# Patient Record
Sex: MALE | Race: WHITE | NOT HISPANIC OR LATINO | Employment: FULL TIME | RURAL
[De-identification: names, ages, dates, MRNs, and addresses within clinical notes are randomized per-mention and may not be internally consistent; named-entity substitution may affect disease eponyms.]

---

## 2023-03-06 DIAGNOSIS — M54.12 CERVICAL RADICULOPATHY: Primary | ICD-10-CM

## 2023-03-06 DIAGNOSIS — M54.16 LUMBAR RADICULOPATHY: ICD-10-CM

## 2023-03-10 ENCOUNTER — HOSPITAL ENCOUNTER (OUTPATIENT)
Dept: RADIOLOGY | Facility: HOSPITAL | Age: 53
Discharge: HOME | End: 2023-03-10
Attending: ORTHOPAEDIC SURGERY
Payer: OTHER GOVERNMENT

## 2023-03-10 ENCOUNTER — OFFICE VISIT (OUTPATIENT)
Dept: SPINE | Facility: CLINIC | Age: 53
End: 2023-03-10
Payer: OTHER GOVERNMENT

## 2023-03-10 VITALS
TEMPERATURE: 98 F | BODY MASS INDEX: 37.94 KG/M2 | WEIGHT: 265 LBS | DIASTOLIC BLOOD PRESSURE: 80 MMHG | OXYGEN SATURATION: 96 % | HEIGHT: 70 IN | HEART RATE: 86 BPM | SYSTOLIC BLOOD PRESSURE: 118 MMHG

## 2023-03-10 DIAGNOSIS — M54.16 LUMBAR RADICULOPATHY: ICD-10-CM

## 2023-03-10 DIAGNOSIS — M54.16 LUMBAR RADICULOPATHY: Primary | ICD-10-CM

## 2023-03-10 DIAGNOSIS — M54.12 CERVICAL RADICULOPATHY: ICD-10-CM

## 2023-03-10 PROCEDURE — 72050 X-RAY EXAM NECK SPINE 4/5VWS: CPT | Mod: TC

## 2023-03-10 PROCEDURE — 72050 XR CERVICAL SPINE AP LAT WITH FLEX EXTEN: ICD-10-PCS | Mod: 26,,, | Performed by: ORTHOPAEDIC SURGERY

## 2023-03-10 PROCEDURE — 99214 OFFICE O/P EST MOD 30 MIN: CPT | Mod: PBBFAC | Performed by: ORTHOPAEDIC SURGERY

## 2023-03-10 PROCEDURE — 99204 PR OFFICE/OUTPT VISIT, NEW, LEVL IV, 45-59 MIN: ICD-10-PCS | Mod: S$PBB,,, | Performed by: ORTHOPAEDIC SURGERY

## 2023-03-10 PROCEDURE — 72110 X-RAY EXAM L-2 SPINE 4/>VWS: CPT | Mod: TC

## 2023-03-10 PROCEDURE — 72110 XR LUMBAR SPINE 4-5 VIEW WITH BENDING VIEWS: ICD-10-PCS | Mod: 26,,, | Performed by: ORTHOPAEDIC SURGERY

## 2023-03-10 PROCEDURE — 72050 X-RAY EXAM NECK SPINE 4/5VWS: CPT | Mod: 26,,, | Performed by: ORTHOPAEDIC SURGERY

## 2023-03-10 PROCEDURE — 72110 X-RAY EXAM L-2 SPINE 4/>VWS: CPT | Mod: 26,,, | Performed by: ORTHOPAEDIC SURGERY

## 2023-03-10 PROCEDURE — 99204 OFFICE O/P NEW MOD 45 MIN: CPT | Mod: S$PBB,,, | Performed by: ORTHOPAEDIC SURGERY

## 2023-03-10 RX ORDER — GABAPENTIN 300 MG/1
300 CAPSULE ORAL 3 TIMES DAILY
COMMUNITY

## 2023-03-10 RX ORDER — IBUPROFEN 800 MG/1
800 TABLET ORAL 3 TIMES DAILY
COMMUNITY

## 2023-03-10 RX ORDER — ATORVASTATIN CALCIUM 10 MG/1
10 TABLET, FILM COATED ORAL DAILY
COMMUNITY

## 2023-03-10 NOTE — PROGRESS NOTES
AP, lateral, flexion/extension views of the lumbar spine reviewed    On the AP there is normal coronal alignment.  There are 5 non-rib-bearing lumbar vertebrae.  On the lateral there is decreased lumbar lordosis.  There is spondylotic disease with decreased disc height and osteophyte formation noted.  No fractures or listhesis noted.  No instability on flexion-extension views.    Impression:  Spondylotic changes of the lumbar spine as noted above

## 2023-03-10 NOTE — PROGRESS NOTES
AP, lateral, flexion/extension views of the cervical spine reviewed    On the AP there is normal coronal alignment.  There is uncovertebral and facet hypertrophy seen.  On the lateral there is loss of cervical lordosis.  There are spondylotic changes noted with decrease in disc height and osteophyte formation seen.  No fractures or listhesis noted.  No instability on flexion-extension views.    Impression:  Degenerative changes of cervical spine as noted above

## 2023-03-10 NOTE — PROGRESS NOTES
MDM/time:  Greater than 45 minutes spent on this encounter including 15 minutes reviewing imaging and notes, 20 minutes with the patient, 10 minutes documentation    ASSESSMENT:  52 y.o. male with cervical spondylosis with myeloradiculopathy, lumbar spondylosis with radiculopathy     PLAN:  Discussed MRI cervical spine - myelopathy discussed surgical options vs monitoring symptoms   Lumbar spine - referral to pain management to discuss lumbar spine injections   And PT lumbar spine   Follow up 3 months     HPI:  52 y.o. male here for evaluation of neck pain that has been off/on for 20+ years.  Patient reports pain over the past year has increased with increased neck pain and weakness in his arms and bilateral hands right greater than left.  He also reports a 20+ year history of lower back pain as well.  Patient reports decreased  strength in bilateral hands.  Issues with balance but no recent falls.  Denies bladder bowel incontinence.  Decreased walking tolerance due to back pain positive shopping cart sign.  Patient currently is taking gabapentin 300 mg tablet 2 tablets 2 to 3 times a day.  No recent physical therapy.  No prior pain management.  Recent MRI 12/07/2023 of his cervical and lumbar spine.  No recent prior spine surgery.  Patient currently vapes nicotine    IMAGING:  AP, lateral, flexion/extension views of the cervical spine     On the AP there is normal coronal alignment.  There is uncovertebral and facet hypertrophy seen. On the lateral there is loss of cervical lordosis.  There are spondylotic changes noted with decrease in disc height and osteophyte formation seen.  No fractures or listhesis noted.  No instability on flexion-extension views.     Impression:  Degenerative changes of cervical spine as noted above            AP, lateral, flexion/extension views of the lumbar spine reviewed     On the AP there is normal coronal alignment.  There are 5 non-rib-bearing lumbar vertebrae.  On the  lateral there is decreased lumbar lordosis.  There is spondylotic disease with decreased disc height and osteophyte formation noted.  No fractures or listhesis noted.  No instability on flexion-extension views.     Impression:  Spondylotic changes of the lumbar spine as noted above        12/07/2022 MRI cervical spine reviewed showed:  C3-4 moderate central stenosis, severe bilateral foraminal stenosis   C4-5 mild central stenosis, severe bilateral foraminal stenosis   C5-6 severe central/moderate foraminal stenosis with myelomalacia   C6-7 severe central stenosis with bilateral foraminal stenosis    12/07/2022 MRI lumbar spine reviewed showed:  L2-3 moderate central stenosis/bilateral lateral recess stenosis with moderate bilateral foraminal stenosis  L3-4 moderate central stenosis/severe bilateral lateral recess stenosis and bilateral foraminal stenosis  L4-5 moderate central stenosis/severe bilateral lateral recess stenosis and moderate bilateral foraminal stenosis  L5-S1 moderate bilateral foraminal stenosis       Past Medical History:   Diagnosis Date    High cholesterol      History reviewed. No pertinent surgical history.  Social History     Tobacco Use    Smoking status: Former     Packs/day: 1.00     Types: Vaping with nicotine, Cigarettes     Quit date: 2020     Years since quitting: 3.1    Smokeless tobacco: Never   Substance Use Topics    Alcohol use: Never    Drug use: Never      Current Outpatient Medications   Medication Instructions    atorvastatin (LIPITOR) 10 mg, Oral, Daily    gabapentin (NEURONTIN) 300 mg, Oral, 3 times daily    ibuprofen (ADVIL,MOTRIN) 800 mg, Oral, 3 times daily        EXAM:  Constitutional  General Appearance:  Body mass index is 38.02 kg/m²., NAD  Psychiatric   Orientation: Oriented to time, oriented to place, oriented to person  Mood and Affect: Active and alert, normal mood, normal affect  Gait and Station   Appearance:  Wide based gait, unable to tandem gait, unable to  walk on toes, unable to walk on heels    CERVICAL  Musculoskeletal System  Shoulder:  normal appearance, no instability, no tenderness, normal ROM right, normal ROM left, no pain with ROM    Cervical Spine  Inspection:  alignment normal, no muscle atrophy  Soft Tissue Palpation on the Right:  no tenderness of the paracervicals, no tenderness of the trapezius, no tenderness of the rhomboid  Soft Tissue Palpation on the Left:  no tenderness of the paracervicals, no tenderness of the trapezius, no tenderness of the rhomboid  Bony Palpation:  no tenderness of the occipital protuberance  Active Range:     Flexion decreased, Extension decreased, Rotation to the left normal, Rotation to the right normal, Lateral flexion to the left normal, Lateral flexion to the right normal   Pain elicited on motion    Motor Strength  C5 on the right:  abduction deltoid 4/5  C5 on the left:  abduction deltoid 4/5  C6 on the Right:  flexion biceps 4/5, wrist extension 4/5  C6 on the Left:  flexion biceps 4/5, wrist extension 4/5  C7 on the Right:  extension fingers 5/5, extension triceps 5/5  C7 on the Left:  extension fingers 5/5, extension triceps 5/5  C8 on the Right:  flexion fingers 5/5  C8 on the Left:  flexion fingers 5/5  T1 on the Right:  abduction fingers 5/5  T1 on the Left:  abduction fingers 5/5    Neurological System  Sensation on the Right:  normal sensation of the extremities: right, normal median nerve distribution, normal ulnar nerve distribution  Sensation on the Left:  normal sensation of the extremities: left, normal median nerve distribution, normal ulnar nerve distribution  Biceps Reflex Right:  normal, Brachioradialis Reflex Right:  normal, Triceps Reflex Right: normal  Biceps Reflex Left:  normal, Brachioradialis Reflex Left: normal, Triceps Reflex Left:  normal  Special Test on the Right:  Spurlings test negative, Hoffmans reflex absent, no ankle clonus, Durkan test negative, Tinels sign negative at the  elbow  Special Test on the Left:  Spurlings test negative, Hoffmans reflex absent, no ankle clonus, Durkan test negative, Tinels sign negative at the elbow    Skin:   Head and Neck:  normal   Right Upper Extremity:  normal   Left Upper Extremity:  normal    Cardiovascular:   Arterial Pulses Right:  radial right   Arterial Pulses Left:  radial left   Edema Right:  none   Edema Left:  none    LUMBAR  Musculoskeletal System   Hips: Normal appearance, no leg length discrepancy, normal motion; left, normal motion; right    Lumbar Spine                   Inspection:  Normal alignment, normal sagittal balance                  Range of motion:  Decreased flexion, extension, lateral bending, rotation. Pain with range of motion                  Bony Palpation of the Lumbar Spine:  No tenderness of the spinous process, no tenderness of the sacrum, no tenderness of the coccyx                  Bony Palpation of the Right Hip:  No tenderness of the iliac crest, no tenderness of the sciatic notch, no tenderness of the SI joint                  Bony Palpation of the Left Hip:  No tenderness of the iliac crest, no tenderness of the sciatic notch, no tenderness of the SI joint                  Soft Tissue Palpation on the Right:  No tenderness of the paraspinal region, no tenderness of the iliolumbar region                  Soft Tissue Palpation on the Left:  No tenderness of the paraspinal region, no tenderness of the iliolumbar region    Motor Strength   L1 Right:  Hip flexion iliopsoas 5/5    L1 Left:  Hip flexion iliopsoas 5/5              L2-L4 Right:  Knee extension quadriceps 5/5, tibialis anterior 5/5              L2-L4 Left:  Knee extension quadriceps 5/5, tibialis anterior 5/5   L5 Right:  Extensor hallucis llongus 5/5,    L5 Left:  Extensor hallucis longus 5/5,    S1 Right:  Plantar flexion gastrocnemius 5/5   S1 Left:  Plantar flexion gastrocnemius 5/5    Neurological System   Ankle Reflex Right:  normal   Ankle Reflex  Left: normal   Knee Reflex Right:  normal   Knee Reflex Left:  normal   Sensation on the Right:  L2 normal, L3 normal, L4 normal, L5 normal, S1 normal   Sensation on the Left:  L2 normal, L3 normal, L4 normal, L5 normal, S1 normal              Special Test on the Right:  Seated straight leg raising test negative, no clonus of the ankle              Special Test on the Left:  Seated straight leg raising test negative, no clonus of the ankle    Skin   Lumbosacral Spine:  Normal skin    Cardiovascular System   Arterial Pulses Right:  Posterior tibialis normal, dorsalis pedis normal   Arterial Pulses Left:  Posterior tibialis normal, dorsalis pedis normal   Edema Right: None   Edema Left:  None

## 2023-04-12 ENCOUNTER — CLINICAL SUPPORT (OUTPATIENT)
Dept: PRIMARY CARE CLINIC | Facility: CLINIC | Age: 53
End: 2023-04-12

## 2023-04-12 DIAGNOSIS — Z02.4 ENCOUNTER FOR DEPARTMENT OF TRANSPORTATION (DOT) EXAMINATION FOR DRIVING LICENSE RENEWAL: Primary | ICD-10-CM

## 2023-04-12 PROCEDURE — 99499 PR PHYSICAL - DOT/CDL: ICD-10-PCS | Mod: ,,, | Performed by: NURSE PRACTITIONER

## 2023-04-12 PROCEDURE — 99499 UNLISTED E&M SERVICE: CPT | Mod: ,,, | Performed by: NURSE PRACTITIONER

## 2023-04-12 NOTE — PROGRESS NOTES
Subjective     Patient ID: Tramaine Bauer is a 52 y.o. male.    Chief Complaint: No chief complaint on file.    HPI  Review of Systems       Objective     Physical Exam       Assessment and Plan     Problem List Items Addressed This Visit    None  Visit Diagnoses       Encounter for Department of Transportation (DOT) examination for driving license renewal    -  Primary            See scanned documents in .

## 2023-05-04 ENCOUNTER — TELEPHONE (OUTPATIENT)
Dept: GASTROENTEROLOGY | Facility: CLINIC | Age: 53
End: 2023-05-04
Payer: OTHER GOVERNMENT

## 2023-05-04 DIAGNOSIS — Z12.11 ENCOUNTER FOR SCREENING COLONOSCOPY: Primary | ICD-10-CM

## 2023-05-17 ENCOUNTER — CLINICAL SUPPORT (OUTPATIENT)
Dept: REHABILITATION | Facility: HOSPITAL | Age: 53
End: 2023-05-17
Payer: OTHER GOVERNMENT

## 2023-05-17 DIAGNOSIS — M25.60 DECREASED RANGE OF MOTION: ICD-10-CM

## 2023-05-17 DIAGNOSIS — R53.1 DECREASED STRENGTH: ICD-10-CM

## 2023-05-17 DIAGNOSIS — M54.16 LUMBAR RADICULOPATHY: Primary | ICD-10-CM

## 2023-05-17 DIAGNOSIS — R52 PAIN: ICD-10-CM

## 2023-05-17 PROCEDURE — 97161 PT EVAL LOW COMPLEX 20 MIN: CPT

## 2023-05-17 NOTE — PLAN OF CARE
[facility]  Physical Therapy Initial Evaluation    Date: 5/17/2023   Name: Tramaine Bauer  Clinic Number: [id number]    Therapy Diagnosis:   Encounter Diagnoses   Name Primary?    Lumbar radiculopathy Yes    Pain     Decreased strength     Decreased range of motion      Physician: Phillip Arteaga MD    Physician Orders: PT Eval and Treat   Medical Diagnosis from Referral: Lumbar Radiculopathy  Evaluation Date: 5/17/2023  Authorization Period Expiration: 3/9/24  Plan of Care Expiration: 8/16/23  Visit # / Visits authorized: 1/ 15    Time In: 210  Time Out: 308  Total Appointment Time (timed & untimed codes): 58 minutes    Precautions: Standard and Fall    Subjective   Date of onset: Chronic condition    History of current condition - TRAMAINE reports: Gradual worsening throughout the past 20 years. Relief with swimming. Prolonged positions make the pain worse in the low back and neck. The neck feels someone is stabbing the lower neck and radiates across the shoulders and is intermittent. The both arms/hand feels like it wants to go to sleep and drop items but right is worse than left. Left handed.   The low back pain is a constant dull ache with occasional tingling and hot pins and needles in the buttocks and down the legs. The left leg is worse than the right. Poor balance but may be from the previous bilateral foot injuries (30 years ago - possible arch collapse) and patient states he has always walked funny and occasionally had a foot slap. Through time the knee pain began. Occasional falls from the left leg buckling or a 'misstep'. Relief with medication, resting and shifting positions. Wake at night from pain and interchange from bed and recliner but recliner hurts the low back more so trying to avoid the recliner.    See MD 6/16  Job Duties:  at [employer] - squatting/lifting/crouching      Medical History:   Past Medical History:   Diagnosis Date    High cholesterol         Surgical History:   Tramaine Bauer  has no past surgical history on file.    Medications:   Tramaine has a current medication list which includes the following prescription(s): atorvastatin, gabapentin, and ibuprofen.    Allergies:   Review of patient's allergies indicates:  Not on File     Imaging, MRI studies, bone scan films: Impression:  Degenerative changes of cervical spine as noted above     AP, lateral, flexion/extension views of the lumbar spine reviewed     On the AP there is normal coronal alignment.  There are 5 non-rib-bearing lumbar vertebrae.  On the lateral there is decreased lumbar lordosis.  There is spondylotic disease with decreased disc height and osteophyte formation noted.  No fractures or listhesis noted.  No instability on flexion-extension views.     Impression:  Spondylotic changes of the lumbar spine as noted above     12/07/2022 MRI cervical spine reviewed showed:  C3-4 moderate central stenosis, severe bilateral foraminal stenosis   C4-5 mild central stenosis, severe bilateral foraminal stenosis   C5-6 severe central/moderate foraminal stenosis with myelomalacia   C6-7 severe central stenosis with bilateral foraminal stenosis     12/07/2022 MRI lumbar spine reviewed showed:  L2-3 moderate central stenosis/bilateral lateral recess stenosis with moderate bilateral foraminal stenosis  L3-4 moderate central stenosis/severe bilateral lateral recess stenosis and bilateral foraminal stenosis  L4-5 moderate central stenosis/severe bilateral lateral recess stenosis and moderate bilateral foraminal stenosis  L5-S1 moderate bilateral foraminal stenosis    Pain:  Current 7/10, worst 10/10,  Location: bilateral back  and neck    Description: Aching, Dull, Throbbing, Tingling, Numb, Electric, and Hot  Aggravating Factors: Sitting, Standing, Bending, Walking, Night Time, Extension, and Lifting  Easing Factors: pain medication and rest        Objective     Unable to single leg balance for  more than 2 seconds and has loss of balance  (+) palpation left PSIS   Pain in the center of the low back during active forward bend   Normal bilateral shoulder range of motion but pain with internal rotation behind back   Hypomobile with left innonimate rotation   Decreased light touch over left L2, L4 and L5 dermatome  Decreased light touch over left C7 and T1  Manual muscle test bilateral hip flexion 4-/5  Manual muscle test bilateral hamstring 3+/5  Manual muscle test bilateral quad 3/5  Manual muscle test right DF 3-/5 - compensates with inversion of ankle and left DF 3+/5  Manual muscle test right glute med 3/5, left glute 3+/5  (+) bilateral sitting slump with burning in the center of lower spine  No leg length  (+) right SHON with pain in the low back     NO FOTO COMPLETED      Home Exercises and Patient Education Provided    Education provided:   Boni Findings  - Patient educated on biomechanical justification for therapeutic exercise and importance of compliance with HEP in order to improve overall impairments and QOL   Patient was educated on all the above exercise prior/during/after for proper posture, positioning, and execution for safe performance with home exercise program.   Patient educated on the importance of improved core and upper and lower extremity strength in order to improve alignment of the spine and upper and lower extremities with static positions and dynamic movement.   Patient educated on the importance of strong core and lower extremity musculature in order to improve both static and dynamic balance, improve gait mechanics, reduce fall risk and improve household and community mobility.     Written Home Exercises Provided:  - .  Exercises were reviewed and TRAMAINE was able to demonstrate them prior to the end of the session.  TRAMAINE demonstrated  -  understanding of the education provided.     See EMR under  -  for exercises provided  - .    Assessment   Tramaine is a 52 y.o. male  referred to outpatient Physical Therapy with a medical diagnosis of low back and neck pain. Patient presents with instability and poor balance. Patient has an old injury of bilateral feet and states he walks 'funny'. Patient has generalized weakness and poor core/hip stabilization and significantly tight muscles of the lower extremities. Patient has had chronic pain for years in the feet, knees and low back. Patient also has neck pain and demonstrates bilateral upper trapezius/paraspinal tightness and guarding. Patient had imaging of cervical and lumbar spine and has areas of severe degeneration which is most likely causing the pain and guarding. Patient will benefit from skilled physical therapy to address deficits.     Patient prognosis is Guarded.     Patient will benefit from skilled outpatient Physical Therapy to address the deficits stated above and in the chart below, provide patient /family education, and to maximize patientt's level of independence.     Plan of care discussed with patient: Yes  Patient's spiritual, cultural and educational needs considered and patient is agreeable to the plan of care and goals as stated below:     Anticipated Barriers for therapy: chronic condition with previous injuries    Goals:  Short Term Goals: 6 weeks   Patient will be able to sleep ~ 3 hours without waking from pain  Patient will improve manual muscle test to 3+/5 for bilateral lower extremities   Patient will maintain prolonged positions x 15 minutes with 6 /10 pain  Patient will report change in pain from constant to intermittent in the low back    Long Term Goals: 12 weeks   Patient will be able to sleep through the night without waking from pain  Patient will improve manual muscle test to 4-/5 for bilateral lower extremities   Patient will maintain prolonged positions x 30 minutes with  4 /10 pain  Patient will be able to balance on each leg x 5 seconds with no loss of balance    Plan   Plan of care  Certification: 5/17/2023 to 8/16/23.    Outpatient Physical Therapy 2 times weekly for 12 weeks to include the following interventions: Aquatic Therapy, Hybresis with Dex, Cervical/Lumbar Traction, Electrical Stimulation IFC/Premod, Gait Training, Manual Therapy, Moist Heat/ Ice, Neuromuscular Re-ed, Patient Education, Self Care, Therapeutic Activities, Therapeutic Exercise, Ultrasound, and Dry Needling.     LAURA DIOP, PT        I CERTIFY THE NEED FOR THESE SERVICES FURNISHED UNDER THIS PLAN OF TREATMENT AND WHILE UNDER MY CARE.    Physician's comments:      Physician's Signature: ____________________________________________Date________________        Please fax this MD signed form to:  [facility]  [phone] fax

## 2023-06-14 ENCOUNTER — CLINICAL SUPPORT (OUTPATIENT)
Dept: REHABILITATION | Facility: HOSPITAL | Age: 53
End: 2023-06-14
Payer: OTHER GOVERNMENT

## 2023-06-14 DIAGNOSIS — M25.60 DECREASED RANGE OF MOTION: ICD-10-CM

## 2023-06-14 DIAGNOSIS — R52 PAIN: Primary | ICD-10-CM

## 2023-06-14 DIAGNOSIS — R53.1 DECREASED STRENGTH: ICD-10-CM

## 2023-06-14 PROCEDURE — 97112 NEUROMUSCULAR REEDUCATION: CPT | Mod: CQ

## 2023-06-14 PROCEDURE — 97012 MECHANICAL TRACTION THERAPY: CPT | Mod: CQ

## 2023-06-14 PROCEDURE — 97110 THERAPEUTIC EXERCISES: CPT | Mod: CQ

## 2023-06-14 NOTE — PROGRESS NOTES
[facility]   Physical Therapy Treatment Note / Discharge Summary    Name: Tramaine Bauer  Clinic Number: [id number]    Therapy Diagnosis:   Encounter Diagnoses   Name Primary?    Pain Yes    Decreased strength     Decreased range of motion      Physician: Phillip Arteaga MD    Visit Date: 6/14/2023    Physician: Phillip Arteaga MD     Physician Orders: PT Eval and Treat   Medical Diagnosis from Referral: Lumbar Radiculopathy  Evaluation Date: 5/17/2023  Authorization Period Expiration: 3/9/24  Plan of Care Expiration: 8/16/23  Visit # / Visits authorized: 2/ 15       PTA Visit #: 1/5     Time In: 11:10 am  Time Out: 12:05 pm  Total Billable Time: 50 minutes    SUBJECTIVE     Pt reports: His pain is a 5/10 today and that he has some tingling on his right side..  He  N/A  compliant with home exercise program.  Response to previous treatment: first treatment after evaluation   Functional change: none     Pain: 5/10  Location: bilateral back      OBJECTIVE     Objective Measures updated at progress report unless specified.     Treatment     TRAMAINE received the treatments listed below:      therapeutic exercises to develop strength, posture, and core stabilization for 16 minutes including:  NuStep 5 minutes   Slant board 3 x 30 seconds  Hamstring stretch 3 x 30 seconds    manual therapy techniques: Joint mobilizations, Manual traction, Soft tissue Mobilization, and Friction Massage were applied to the: - for - minutes, including:    neuromuscular re-education activities to improve: Balance, Coordination, Proprioception, and Posture for 19 minutes. The following activities were included:  Pelvic tilt 2 x 10   Marches 2 x 10   Lower trunk rotation x 10   Hip abduction ---    supervised modalities after being cleared for contradictions: TENS:  Tramaine received TENS electrical stimulation for pain to the lower back. Pt received continuous mode - for  -minutes. Tramaine tolerated  treatment well without any adverse effects.     Mechanical Traction:  Tramaine received intermittent mechanical traction to the lumbar spine at a force of 80 pounds for a total of 15 minutes. Hold time of 60 seconds and rest time for 20 seconds. Patient tolerated treatment well without any adverse effects.        Patient Education and Home Exercises     Home Exercises Provided and Patient Education Provided     Education provided:   - home exercise program will be given next visit    Written Home Exercises Provided:  N/a . Exercises were reviewed and TRAMAINE was able to demonstrate them prior to the end of the session.  TRAMAINE demonstrated  N/A  understanding of the education provided. See EMR under Patient Instructions for exercises provided during therapy sessions    ASSESSMENT     Case conference with Amelia Samano DPT. Patient was informed on correct body mechanics to perform exercises correctly. Patient was very guarded throughout treatment session and did have minimal complaints of pain throughout treatment session . Patient had difficulty with exercises and had complaints of muscles spasms on left side due to compensation from pain in right.    TRAMAINE Is progressing towards his goals.   Pt prognosis is Good.     Pt will continue to benefit from skilled outpatient physical therapy to address the deficits listed in the problem list box on initial evaluation, provide pt/family education and to maximize pt's level of independence in the home and community environment.     Pt's spiritual, cultural and educational needs considered and pt agreeable to plan of care and goals.     Anticipated barriers to physical therapy: chronic pain    Goals:   Short Term Goals: 6 weeks   Patient will be able to sleep ~ 3 hours without waking from pain  Patient will improve manual muscle test to 3+/5 for bilateral lower extremities   Patient will maintain prolonged positions x 15 minutes with 6 /10 pain  Patient will report  change in pain from constant to intermittent in the low back     Long Term Goals: 12 weeks   Patient will be able to sleep through the night without waking from pain  Patient will improve manual muscle test to 4-/5 for bilateral lower extremities   Patient will maintain prolonged positions x 30 minutes with  4 /10 pain  Patient will be able to balance on each leg x 5 seconds with no loss of balance    PLAN     Plan of care Certification: 5/17/2023 to 8/16/23.     Outpatient Physical Therapy 2 times weekly for 12 weeks to include the following interventions: Aquatic Therapy, Hybresis with Dex, Cervical/Lumbar Traction, Electrical Stimulation IFC/Premod, Gait Training, Manual Therapy, Moist Heat/ Ice, Neuromuscular Re-ed, Patient Education, Self Care, Therapeutic Activities, Therapeutic Exercise, Ultrasound, and Dry Needling.     Lainey Martel, STEVO   06/14/2023      Patient did not return after session. Reason Unknown. Please consider this note a discharge from physical therapy. Thank you for this referral!  Amelia Samano DPT, MTC

## 2023-08-16 ENCOUNTER — OFFICE VISIT (OUTPATIENT)
Dept: FAMILY MEDICINE | Facility: CLINIC | Age: 53
End: 2023-08-16
Payer: OTHER GOVERNMENT

## 2023-08-16 VITALS
HEART RATE: 78 BPM | OXYGEN SATURATION: 97 % | TEMPERATURE: 98 F | WEIGHT: 265 LBS | SYSTOLIC BLOOD PRESSURE: 165 MMHG | HEIGHT: 70 IN | BODY MASS INDEX: 37.94 KG/M2 | DIASTOLIC BLOOD PRESSURE: 91 MMHG

## 2023-08-16 DIAGNOSIS — J20.8 ACUTE BRONCHITIS DUE TO OTHER SPECIFIED ORGANISMS: ICD-10-CM

## 2023-08-16 DIAGNOSIS — R05.1 ACUTE COUGH: ICD-10-CM

## 2023-08-16 DIAGNOSIS — Z20.822 LAB TEST NEGATIVE FOR COVID-19 VIRUS: ICD-10-CM

## 2023-08-16 DIAGNOSIS — J06.9 UPPER RESPIRATORY INFECTION, ACUTE: Primary | ICD-10-CM

## 2023-08-16 PROBLEM — M54.2 NECK PAIN: Status: ACTIVE | Noted: 2023-08-16

## 2023-08-16 PROBLEM — K21.9 GASTROESOPHAGEAL REFLUX DISEASE: Status: ACTIVE | Noted: 2023-08-16

## 2023-08-16 PROBLEM — M54.12 CERVICAL RADICULOPATHY: Status: ACTIVE | Noted: 2023-08-16

## 2023-08-16 PROBLEM — F17.200 SMOKER: Status: ACTIVE | Noted: 2023-08-16

## 2023-08-16 PROBLEM — M54.50 CHRONIC LOW BACK PAIN: Status: ACTIVE | Noted: 2023-08-16

## 2023-08-16 PROBLEM — G89.29 CHRONIC LOW BACK PAIN: Status: ACTIVE | Noted: 2023-08-16

## 2023-08-16 PROBLEM — M54.16 LUMBAR RADICULOPATHY: Status: ACTIVE | Noted: 2023-08-16

## 2023-08-16 PROBLEM — E66.01 MORBID OBESITY: Status: ACTIVE | Noted: 2023-08-16

## 2023-08-16 PROBLEM — Z72.0 TOBACCO USER: Status: ACTIVE | Noted: 2023-08-16

## 2023-08-16 PROBLEM — M21.40 PES PLANUS: Status: ACTIVE | Noted: 2023-08-16

## 2023-08-16 PROBLEM — E88.810 METABOLIC SYNDROME: Status: ACTIVE | Noted: 2023-08-16

## 2023-08-16 PROBLEM — M22.40 CHONDROMALACIA PATELLAE: Status: ACTIVE | Noted: 2018-06-18

## 2023-08-16 PROBLEM — E78.5 HYPERLIPIDEMIA: Status: ACTIVE | Noted: 2023-08-16

## 2023-08-16 LAB
CTP QC/QA: YES
FLUAV AG NPH QL: NEGATIVE
FLUBV AG NPH QL: NEGATIVE
SARS-COV-2 AG RESP QL IA.RAPID: NEGATIVE

## 2023-08-16 PROCEDURE — 99214 OFFICE O/P EST MOD 30 MIN: CPT | Mod: ,,, | Performed by: NURSE PRACTITIONER

## 2023-08-16 PROCEDURE — 99214 PR OFFICE/OUTPT VISIT, EST, LEVL IV, 30-39 MIN: ICD-10-PCS | Mod: ,,, | Performed by: NURSE PRACTITIONER

## 2023-08-16 PROCEDURE — 87428 POCT SARS-COV2 (COVID) WITH FLU ANTIGEN: ICD-10-PCS | Mod: QW,,, | Performed by: NURSE PRACTITIONER

## 2023-08-16 PROCEDURE — 87428 SARSCOV & INF VIR A&B AG IA: CPT | Mod: QW,,, | Performed by: NURSE PRACTITIONER

## 2023-08-16 RX ORDER — DOXYCYCLINE HYCLATE 100 MG
100 TABLET ORAL EVERY 12 HOURS
Qty: 20 TABLET | Refills: 0 | Status: SHIPPED | OUTPATIENT
Start: 2023-08-16

## 2023-08-16 RX ORDER — BENZONATATE 100 MG/1
100 CAPSULE ORAL 3 TIMES DAILY PRN
Qty: 30 CAPSULE | Refills: 0 | Status: SHIPPED | OUTPATIENT
Start: 2023-08-16 | End: 2023-08-26

## 2023-08-16 RX ORDER — ALBUTEROL SULFATE 90 UG/1
2 AEROSOL, METERED RESPIRATORY (INHALATION) EVERY 6 HOURS PRN
Qty: 18 G | Refills: 0 | Status: SHIPPED | OUTPATIENT
Start: 2023-08-16 | End: 2024-08-15

## 2023-08-16 RX ORDER — METHYLPREDNISOLONE 4 MG/1
TABLET ORAL
Qty: 21 TABLET | Refills: 0 | Status: SHIPPED | OUTPATIENT
Start: 2023-08-16

## 2023-08-16 RX ORDER — GUAIFENESIN 1200 MG/1
1 TABLET, EXTENDED RELEASE ORAL EVERY 12 HOURS
Qty: 20 TABLET | Refills: 0 | Status: SHIPPED | OUTPATIENT
Start: 2023-08-16 | End: 2023-08-26

## 2023-08-16 NOTE — PROGRESS NOTES
"Subjective:       Patient ID: Tramaine Bauer is a 52 y.o. male.    Vitals:  height is 5' 10" (1.778 m) and weight is 120.2 kg (265 lb). His temperature is 97.7 °F (36.5 °C). His blood pressure is 165/91 (abnormal) and his pulse is 78. His oxygen saturation is 97%.     Chief Complaint: Cough, Headache, and Nasal Congestion (Been going on for two weeks )    SR is a 53 y/o WM who presents today with c/o URI symptoms x10-14 days.     URI   This is a new problem. The current episode started 1 to 4 weeks ago. The problem has been gradually worsening. There has been no fever. Associated symptoms include congestion, coughing, headaches, a plugged ear sensation, rhinorrhea, sinus pain and sneezing. Pertinent negatives include no ear pain, nausea, sore throat, vomiting or wheezing. He has tried increased fluids for the symptoms. The treatment provided mild relief.       Constitution: Negative for chills, fatigue and fever.   HENT:  Positive for congestion, postnasal drip and sinus pain. Negative for ear pain and sore throat.    Respiratory:  Positive for cough and sputum production (small amount). Negative for chest tightness and wheezing.    Gastrointestinal:  Negative for nausea and vomiting.   Allergic/Immunologic: Positive for sneezing.   Neurological:  Positive for headaches.         Objective:      Physical Exam  Vitals reviewed.   Constitutional:       Appearance: Normal appearance. He is obese. He is ill-appearing.   HENT:      Nose: Congestion present.      Right Turbinates: Swollen.      Left Turbinates: Swollen.      Mouth/Throat:      Pharynx: Posterior oropharyngeal erythema present.      Comments: Postnasal drip  Cardiovascular:      Rate and Rhythm: Normal rate and regular rhythm.   Pulmonary:      Effort: Pulmonary effort is normal.      Breath sounds: Examination of the right-middle field reveals wheezing. Wheezing and rhonchi present.   Neurological:      Mental Status: He is alert and oriented to " person, place, and time.   Psychiatric:         Mood and Affect: Mood normal.         Behavior: Behavior normal.         Thought Content: Thought content normal.         Judgment: Judgment normal.        Assessment:       1. Upper respiratory infection, acute    2. Acute cough    3. Lab test negative for COVID-19 virus    4. Acute bronchitis due to other specified organisms          Recent Results (from the past 24 hour(s))   POCT SARS-COV2 (COVID) with Flu Antigen    Collection Time: 08/16/23 10:42 AM   Result Value Ref Range    SARS Coronavirus 2 Antigen Negative Negative    Rapid Influenza A Ag Negative Negative    Rapid Influenza B Ag Negative Negative     Acceptable Yes         Plan:         Upper respiratory infection, acute  -     doxycycline (VIBRA-TABS) 100 MG tablet; Take 1 tablet (100 mg total) by mouth every 12 (twelve) hours.  Dispense: 20 tablet; Refill: 0  -     methylPREDNISolone (MEDROL DOSEPACK) 4 mg tablet; use as directed  Dispense: 21 tablet; Refill: 0  -     guaiFENesin (MUCINEX) 1,200 mg Ta12; Take 1 tablet by mouth every 12 (twelve) hours. for 10 days  Dispense: 20 tablet; Refill: 0    Acute cough  -     POCT SARS-COV2 (COVID) with Flu Antigen  -     benzonatate (TESSALON) 100 MG capsule; Take 1 capsule (100 mg total) by mouth 3 (three) times daily as needed for Cough.  Dispense: 30 capsule; Refill: 0    Lab test negative for COVID-19 virus    Acute bronchitis due to other specified organisms  -     albuterol (VENTOLIN HFA) 90 mcg/actuation inhaler; Inhale 2 puffs into the lungs every 6 (six) hours as needed for Wheezing. Rescue  Dispense: 18 g; Refill: 0      No follow-ups on file.     Future Appointments   Date Time Provider Department Center   8/16/2023 12:00 PM Cira Duffy FNP [facility] DEQUAN Valdez After Visit Summary was printed and given to the patient.     Signature:    DARIELA León  Family Nurse Practitioner  [facility]    Date of encounter: 8/16/23

## 2023-08-16 NOTE — PATIENT INSTRUCTIONS
Please drink plenty of fluids.  Please get plenty of rest.  Please return here or go to the Emergency Department for any concerns or worsening of condition.  If you were prescribed antibiotics, please take them to completion.  If you were given wait & see antibiotics, please wait 5-7 days before taking them, and only take them if your symptoms have worsened or not improved.  If you do begin taking the antibiotics, please take them to completion.  If you were prescribed a narcotic medication, do not drive or operate heavy equipment or machinery while taking these medications.  If you were given a steroid shot in the clinic and have also been given a prescription for a steroid such as Prednisone or a Medrol Dose Pack, please begin taking them tomorrow.  If you do not have Hypertension or any history of palpitations, it is ok to take over the counter Sudafed or Mucinex D or Allegra-D or Claritin-D or Zyrtec-D.  If you do take one of the above, it is ok to combine that with plain over the counter Mucinex or Allegra or Claritin or Zyrtec.  If for example you are taking Zyrtec -D, you can combine that with Mucinex, but not Mucinex-D.  If you are taking Mucinex-D, you can combine that with plain Allegra or Claritin or Zyrtec.   If you do have Hypertension or palpitations, it is safe to take Coricidin HBP for relief of sinus symptoms.  If not allergic, please take over the counter Tylenol (Acetaminophen) and/or Motrin (Ibuprofen) as directed for control of pain and/or fever.  Please follow up with your primary care doctor or specialist as needed.    If you  smoke, please stop smoking.

## 2023-09-15 PROBLEM — R52 PAIN: Status: RESOLVED | Noted: 2023-05-17 | Resolved: 2023-09-15

## 2023-09-15 PROBLEM — R53.1 DECREASED STRENGTH: Status: RESOLVED | Noted: 2023-05-17 | Resolved: 2023-09-15

## 2023-09-15 PROBLEM — M25.60 DECREASED RANGE OF MOTION: Status: RESOLVED | Noted: 2023-05-17 | Resolved: 2023-09-15

## 2023-09-19 DIAGNOSIS — Z12.11 SCREENING FOR COLON CANCER: Primary | ICD-10-CM

## 2024-02-07 ENCOUNTER — ANESTHESIA EVENT (OUTPATIENT)
Dept: GASTROENTEROLOGY | Facility: HOSPITAL | Age: 54
End: 2024-02-07
Payer: OTHER GOVERNMENT

## 2024-02-07 ENCOUNTER — HOSPITAL ENCOUNTER (OUTPATIENT)
Dept: GASTROENTEROLOGY | Facility: HOSPITAL | Age: 54
Discharge: HOME | End: 2024-02-07
Attending: INTERNAL MEDICINE
Payer: OTHER GOVERNMENT

## 2024-02-07 ENCOUNTER — ANESTHESIA (OUTPATIENT)
Dept: GASTROENTEROLOGY | Facility: HOSPITAL | Age: 54
End: 2024-02-07
Payer: OTHER GOVERNMENT

## 2024-02-07 VITALS
OXYGEN SATURATION: 96 % | SYSTOLIC BLOOD PRESSURE: 125 MMHG | DIASTOLIC BLOOD PRESSURE: 85 MMHG | TEMPERATURE: 98 F | WEIGHT: 265 LBS | BODY MASS INDEX: 37.94 KG/M2 | RESPIRATION RATE: 17 BRPM | HEIGHT: 70 IN | HEART RATE: 81 BPM

## 2024-02-07 DIAGNOSIS — Z83.719 FAMILY HISTORY OF COLONIC POLYPS: Primary | ICD-10-CM

## 2024-02-07 DIAGNOSIS — D12.4 ADENOMATOUS POLYP OF DESCENDING COLON: ICD-10-CM

## 2024-02-07 DIAGNOSIS — D12.8 ADENOMATOUS POLYP OF RECTUM: ICD-10-CM

## 2024-02-07 DIAGNOSIS — K63.5 POLYP OF TRANSVERSE COLON, UNSPECIFIED TYPE: ICD-10-CM

## 2024-02-07 DIAGNOSIS — K63.5 POLYP OF CECUM: ICD-10-CM

## 2024-02-07 DIAGNOSIS — Z12.11 SCREENING FOR COLON CANCER: ICD-10-CM

## 2024-02-07 PROCEDURE — 37000008 HC ANESTHESIA 1ST 15 MINUTES

## 2024-02-07 PROCEDURE — 37000009 HC ANESTHESIA EA ADD 15 MINS

## 2024-02-07 PROCEDURE — 45385 COLONOSCOPY W/LESION REMOVAL: CPT | Mod: 33,,, | Performed by: INTERNAL MEDICINE

## 2024-02-07 PROCEDURE — 27000284 HC CANNULA NASAL: Performed by: NURSE ANESTHETIST, CERTIFIED REGISTERED

## 2024-02-07 PROCEDURE — 45380 COLONOSCOPY AND BIOPSY: CPT | Mod: 33,59,, | Performed by: INTERNAL MEDICINE

## 2024-02-07 PROCEDURE — 45385 COLONOSCOPY W/LESION REMOVAL: CPT | Mod: PT | Performed by: INTERNAL MEDICINE

## 2024-02-07 PROCEDURE — 45380 COLONOSCOPY AND BIOPSY: CPT | Mod: PT,59 | Performed by: INTERNAL MEDICINE

## 2024-02-07 PROCEDURE — 63600175 PHARM REV CODE 636 W HCPCS: Performed by: NURSE ANESTHETIST, CERTIFIED REGISTERED

## 2024-02-07 PROCEDURE — 88305 TISSUE EXAM BY PATHOLOGIST: CPT | Mod: TC,SUR | Performed by: INTERNAL MEDICINE

## 2024-02-07 PROCEDURE — D9220A PRA ANESTHESIA: Mod: 33,,, | Performed by: NURSE ANESTHETIST, CERTIFIED REGISTERED

## 2024-02-07 PROCEDURE — 25000003 PHARM REV CODE 250: Performed by: NURSE ANESTHETIST, CERTIFIED REGISTERED

## 2024-02-07 PROCEDURE — 27201423 OPTIME MED/SURG SUP & DEVICES STERILE SUPPLY

## 2024-02-07 PROCEDURE — 27000716 HC OXISENSOR PROBE, ANY SIZE: Performed by: NURSE ANESTHETIST, CERTIFIED REGISTERED

## 2024-02-07 PROCEDURE — 88305 TISSUE EXAM BY PATHOLOGIST: CPT | Mod: 26,,, | Performed by: PATHOLOGY

## 2024-02-07 RX ORDER — LIDOCAINE HYDROCHLORIDE 20 MG/ML
INJECTION, SOLUTION EPIDURAL; INFILTRATION; INTRACAUDAL; PERINEURAL
Status: DISCONTINUED | OUTPATIENT
Start: 2024-02-07 | End: 2024-02-07

## 2024-02-07 RX ORDER — SODIUM CHLORIDE 0.9 % (FLUSH) 0.9 %
10 SYRINGE (ML) INJECTION
Status: DISCONTINUED | OUTPATIENT
Start: 2024-02-07 | End: 2024-02-08 | Stop reason: HOSPADM

## 2024-02-07 RX ORDER — PROPOFOL 10 MG/ML
VIAL (ML) INTRAVENOUS
Status: DISCONTINUED | OUTPATIENT
Start: 2024-02-07 | End: 2024-02-07

## 2024-02-07 RX ADMIN — SODIUM CHLORIDE: 9 INJECTION, SOLUTION INTRAVENOUS at 11:02

## 2024-02-07 RX ADMIN — LIDOCAINE HYDROCHLORIDE 100 MG: 20 INJECTION, SOLUTION INTRAVENOUS at 12:02

## 2024-02-07 RX ADMIN — PROPOFOL 100 MG: 10 INJECTION, EMULSION INTRAVENOUS at 12:02

## 2024-02-07 NOTE — TRANSFER OF CARE
"Anesthesia Transfer of Care Note    Patient: Tramaine Bauer    Procedure(s) Performed: * No procedures listed *    Patient location: GI    Anesthesia Type: general    Transport from OR: Transported from OR on room air with adequate spontaneous ventilation. Continuous ECG monitoring in transport. Continuous SpO2 monitoring in transport    Post pain: adequate analgesia    Post assessment: no apparent anesthetic complications    Post vital signs: stable    Level of consciousness: sedated and responds to stimulation    Nausea/Vomiting: no nausea/vomiting    Complications: none    Transfer of care protocol was followedComments: Good SV continue, NAD, VSS, RTRN      Last vitals: Visit Vitals  BP (!) 119/50 (BP Location: Right forearm, Patient Position: Lying)   Pulse 83   Temp 36.6 °C (97.8 °F) (Oral)   Resp 20   Ht 5' 10" (1.778 m)   Wt 120.2 kg (265 lb)   SpO2 98%   BMI 38.02 kg/m²     "

## 2024-02-07 NOTE — DISCHARGE INSTRUCTIONS
Procedure Date  2/7/24     Impression  Overall Impression:   5 polyps were removed  A cecum polyp and a distal transverse colon polyp were removed via biopsy. A descending colon polyp at the splenic flexure was removed via hot snare. Two rectal polyps were removed via hot snare and one of the rectal polypectomy sites was closed with a hemostasis clip.     Recommendation    Await pathology results     Repeat colonoscopy in 3 years      Discharge: disp; DC to home. High fiber diet. No driving x 24 hours. Follow-up with PCP as scheduled.  Dx: five colon polyps were removed during this exam; family history of colon polyps.    No driving today, no operating heavy machinery, no signing any legal documents until tomorrow.    Drink lots of fluids, resume regular diet.  Take your normal medications.     Please call our office for any nausea, vomiting or abdominal pain.

## 2024-02-07 NOTE — H&P
[facility]  Gastroenterology  H&P    Patient Name: Tramaine Bauer  MRN: [id number]  Admission Date: 2024  Code Status: No Order    Attending Provider: Jr Short MD   Primary Care Physician: Beba Day NP  Principal Problem:<principal problem not specified>    Subjective:     History of Present Illness: Pt presents for screening colonoscopy and no prior colonoscopy report is available.    Past Medical History:   Diagnosis Date    High cholesterol        History reviewed. No pertinent surgical history.    Review of patient's allergies indicates:  No Known Allergies  Family History       Problem Relation (Age of Onset)    Bone cancer Father          Tobacco Use    Smoking status: Former     Current packs/day: 0.00     Types: Vaping with nicotine, Cigarettes     Quit date: 2020     Years since quittin.1    Smokeless tobacco: Never   Substance and Sexual Activity    Alcohol use: Never    Drug use: Never    Sexual activity: Not on file     Review of Systems   Respiratory: Negative.     Cardiovascular: Negative.    Gastrointestinal: Negative.      Objective:     Vital Signs (Most Recent):  Pulse: 83 (24 1136)  Resp: 19 (24 1136)  BP: 139/76 (24 1136)  SpO2: 97 % (24 1136) Vital Signs (24h Range):  Pulse:  [83] 83  Resp:  [19] 19  SpO2:  [97 %] 97 %  BP: (139)/(76) 139/76     Weight: 120.2 kg (265 lb) (24 1136)  Body mass index is 38.02 kg/m².    No intake or output data in the 24 hours ending 24 1157    Lines/Drains/Airways       Peripheral Intravenous Line  Duration                  Peripheral IV - Single Lumen 24 1135 22 G Left;Posterior Hand <1 day                    Physical Exam  Vitals reviewed.   Constitutional:       General: He is not in acute distress.     Appearance: Normal appearance. He is well-developed. He is obese. He is not ill-appearing.   HENT:      Head: Normocephalic and atraumatic.      Nose: Nose normal.   Eyes:       "Pupils: Pupils are equal, round, and reactive to light.   Cardiovascular:      Rate and Rhythm: Normal rate and regular rhythm.   Pulmonary:      Effort: Pulmonary effort is normal.      Breath sounds: Normal breath sounds. No wheezing.   Abdominal:      General: Abdomen is flat. Bowel sounds are normal. There is no distension.      Palpations: Abdomen is soft.      Tenderness: There is no abdominal tenderness. There is no guarding.   Skin:     General: Skin is warm and dry.      Coloration: Skin is not jaundiced.   Neurological:      Mental Status: He is alert.   Psychiatric:         Attention and Perception: Attention normal.         Mood and Affect: Affect normal.         Speech: Speech normal.         Behavior: Behavior is cooperative.      Comments: Pt was calm while speaking.         Significant Labs:  CBC: No results for input(s): "WBC", "HGB", "HCT", "PLT" in the last 48 hours.  CMP: No results for input(s): "GLU", "CALCIUM", "ALBUMIN", "PROT", "NA", "K", "CO2", "CL", "BUN", "CREATININE", "ALKPHOS", "ALT", "AST", "BILITOT" in the last 48 hours.    Significant Imaging:  Imaging results within the past 24 hours have been reviewed.    Assessment/Plan:     There are no hospital problems to display for this patient.        Imp: screening for colon neoplasm  Plan: colonoscopy    Jr Short MD  Gastroenterology  [facility]  "

## 2024-02-07 NOTE — ANESTHESIA POSTPROCEDURE EVALUATION
Anesthesia Post Evaluation    Patient: Tramaine Bauer    Procedure(s) Performed: Colonoscopy    Final Anesthesia Type: general      Patient location during evaluation: GI PACU  Patient participation: Yes- Able to Participate  Level of consciousness: awake and alert  Post-procedure vital signs: reviewed and stable  Pain management: adequate  Airway patency: patent    PONV status at discharge: No PONV  Anesthetic complications: no      Cardiovascular status: blood pressure returned to baseline and hemodynamically stable  Respiratory status: spontaneous ventilation  Hydration status: euvolemic  Follow-up not needed.  Comments: Refer to nursing notes for pain/maribel score upon discharge from recovery.              Vitals Value Taken Time   /85 02/07/24 1257   Temp 97.8 02/07/24 1345   Pulse 81 02/07/24 1257   Resp 17 02/07/24 1257   SpO2 96 % 02/07/24 1257         Event Time   Out of Recovery 13:05:02         Pain/Maribel Score: Maribel Score: 9 (2/7/2024 12:39 PM)

## 2024-02-07 NOTE — ANESTHESIA PREPROCEDURE EVALUATION
02/07/2024  Tramaine Bauer is a 53 y.o., male.      Pre-op Assessment    I have reviewed the Patient Summary Reports.     I have reviewed the Nursing Notes. I have reviewed the NPO Status.   I have reviewed the Medications.     Review of Systems  Anesthesia Hx:  No problems with previous Anesthesia                Social:  Former Smoker, No Alcohol Use       Cardiovascular:                hyperlipidemia                             Hepatic/GI:     GERD, well controlled             Musculoskeletal:  Arthritis   Cervical and Lumbar radiculopathy       Spine Disorders: cervical and lumbar Chronic Pain           Neurological:    Neuromuscular Disease,             Chronic Pain Syndrome                         Endocrine:        Obesity / BMI > 30      Physical Exam  General: Well nourished, Cooperative, Alert and Oriented    Airway:  Mallampati: III   Mouth Opening: Normal  TM Distance: Normal  Tongue: Large  Neck ROM: Normal ROM    Dental:  Intact        Anesthesia Plan  Type of Anesthesia, risks & benefits discussed:    Anesthesia Type: Gen Natural Airway, MAC  Intra-op Monitoring Plan: Standard ASA Monitors  Post Op Pain Control Plan: multimodal analgesia and IV/PO Opioids PRN  Induction:  IV  Informed Consent: Informed consent signed with the Patient and all parties understand the risks and agree with anesthesia plan.  All questions answered. Patient consented to blood products? Yes  ASA Score: 3  Day of Surgery Review of History & Physical: I have interviewed and examined the patient. I have reviewed the patient's H&P dated: There are no significant changes.     Ready For Surgery From Anesthesia Perspective.     .  Past Medical History:   Diagnosis Date    High cholesterol        No past surgical history on file.    Family History   Problem Relation Age of Onset    Bone cancer Father        Social  History     Socioeconomic History    Marital status: Single   Tobacco Use    Smoking status: Former     Current packs/day: 0.00     Types: Vaping with nicotine, Cigarettes     Quit date:      Years since quittin.1    Smokeless tobacco: Never   Substance and Sexual Activity    Alcohol use: Never    Drug use: Never       Current Outpatient Medications   Medication Sig Dispense Refill    albuterol (VENTOLIN HFA) 90 mcg/actuation inhaler Inhale 2 puffs into the lungs every 6 (six) hours as needed for Wheezing. Rescue 18 g 0    atorvastatin (LIPITOR) 10 MG tablet Take 10 mg by mouth once daily.      doxycycline (VIBRA-TABS) 100 MG tablet Take 1 tablet (100 mg total) by mouth every 12 (twelve) hours. 20 tablet 0    gabapentin (NEURONTIN) 300 MG capsule Take 300 mg by mouth 3 (three) times daily.      ibuprofen (ADVIL,MOTRIN) 800 MG tablet Take 800 mg by mouth 3 (three) times daily.      methylPREDNISolone (MEDROL DOSEPACK) 4 mg tablet use as directed 21 tablet 0     No current facility-administered medications for this encounter.       Review of patient's allergies indicates:  No Known Allergies   Patient Active Problem List   Diagnosis    Cervical radiculopathy    Lumbar radiculopathy    Chronic low back pain    Chondromalacia patellae    Gastroesophageal reflux disease    Hyperlipidemia    Metabolic syndrome    Morbid obesity    Neck pain    Pes planus    Smoker    Tobacco user

## 2024-02-08 ENCOUNTER — TELEPHONE (OUTPATIENT)
Dept: GASTROENTEROLOGY | Facility: CLINIC | Age: 54
End: 2024-02-08
Payer: OTHER GOVERNMENT

## 2024-02-08 LAB
DHEA SERPL-MCNC: NORMAL
ESTROGEN SERPL-MCNC: NORMAL PG/ML
INSULIN SERPL-ACNC: NORMAL U[IU]/ML
LAB AP GROSS DESCRIPTION: NORMAL
LAB AP LABORATORY NOTES: NORMAL
T3RU NFR SERPL: NORMAL %

## 2024-02-08 NOTE — TELEPHONE ENCOUNTER
Patient calling about his pathology results that he has viewed on patient portal. He is concerned about the findings on the rectal polyp. Informed patient that Dr. Short has not viewed the results yet, and I cannot discuss with him until Dr. Short sends me his official recommendations, but I did let him know that all polyps were removed, and that as soon as Dr. Short sends me the results with his recommendations I will give him a call back. Patient verbalized understanding.

## 2024-02-08 NOTE — PROGRESS NOTES
Colon polyps were mainly tubular adenomas, but the cecum polyp was inflammatory and one of the rectal polyps was severely dysplastic. The distal rectal polyp and the more proximal rectal polypectomy site can both be seen on the procedure images.  Recommend: repeat colonoscopy in 1 year.

## 2024-02-09 ENCOUNTER — PATIENT MESSAGE (OUTPATIENT)
Dept: GASTROENTEROLOGY | Facility: CLINIC | Age: 54
End: 2024-02-09
Payer: OTHER GOVERNMENT

## 2024-11-13 ENCOUNTER — PATIENT MESSAGE (OUTPATIENT)
Dept: RESEARCH | Facility: HOSPITAL | Age: 54
End: 2024-11-13